# Patient Record
Sex: FEMALE
[De-identification: names, ages, dates, MRNs, and addresses within clinical notes are randomized per-mention and may not be internally consistent; named-entity substitution may affect disease eponyms.]

---

## 2019-07-26 PROBLEM — Z00.00 ENCOUNTER FOR PREVENTIVE HEALTH EXAMINATION: Status: ACTIVE | Noted: 2019-07-26

## 2019-08-01 ENCOUNTER — NON-APPOINTMENT (OUTPATIENT)
Age: 44
End: 2019-08-01

## 2019-08-01 ENCOUNTER — APPOINTMENT (OUTPATIENT)
Dept: HEART AND VASCULAR | Facility: CLINIC | Age: 44
End: 2019-08-01
Payer: COMMERCIAL

## 2019-08-01 VITALS
HEIGHT: 67 IN | HEART RATE: 66 BPM | SYSTOLIC BLOOD PRESSURE: 102 MMHG | WEIGHT: 118 LBS | DIASTOLIC BLOOD PRESSURE: 70 MMHG | BODY MASS INDEX: 18.52 KG/M2

## 2019-08-01 DIAGNOSIS — E55.9 VITAMIN D DEFICIENCY, UNSPECIFIED: ICD-10-CM

## 2019-08-01 DIAGNOSIS — R07.9 CHEST PAIN, UNSPECIFIED: ICD-10-CM

## 2019-08-01 DIAGNOSIS — J45.909 UNSPECIFIED ASTHMA, UNCOMPLICATED: ICD-10-CM

## 2019-08-01 DIAGNOSIS — R79.0 ABNORMAL LVL OF BLOOD MINERAL: ICD-10-CM

## 2019-08-01 DIAGNOSIS — I34.1 NONRHEUMATIC MITRAL (VALVE) PROLAPSE: ICD-10-CM

## 2019-08-01 DIAGNOSIS — Z01.818 ENCOUNTER FOR OTHER PREPROCEDURAL EXAMINATION: ICD-10-CM

## 2019-08-01 DIAGNOSIS — L71.9 ROSACEA, UNSPECIFIED: ICD-10-CM

## 2019-08-01 DIAGNOSIS — Z86.39 PERSONAL HISTORY OF OTHER ENDOCRINE, NUTRITIONAL AND METABOLIC DISEASE: ICD-10-CM

## 2019-08-01 PROCEDURE — 99205 OFFICE O/P NEW HI 60 MIN: CPT | Mod: 25

## 2019-08-01 PROCEDURE — 93000 ELECTROCARDIOGRAM COMPLETE: CPT

## 2019-08-01 RX ORDER — ACETYLCYSTEINE 200 MG/ML
INJECTION INTRAVENOUS
Refills: 0 | Status: ACTIVE | COMMUNITY

## 2019-08-01 RX ORDER — MULTIVITAMIN
TABLET ORAL
Refills: 0 | Status: ACTIVE | COMMUNITY

## 2019-08-01 RX ORDER — NIACIN 100 MG
TABLET ORAL
Refills: 0 | Status: ACTIVE | COMMUNITY

## 2019-08-01 RX ORDER — AMINO ACIDS
CAPSULE ORAL
Refills: 0 | Status: ACTIVE | COMMUNITY

## 2019-08-01 RX ORDER — TAURINE 500 MG
CAPSULE ORAL
Refills: 0 | Status: ACTIVE | COMMUNITY

## 2019-08-01 RX ORDER — PEPPERMINT OIL
OIL (ML) MISCELLANEOUS
Refills: 0 | Status: ACTIVE | COMMUNITY

## 2019-08-01 RX ORDER — CALCIUM CARBONATE/VITAMIN D3 600 MG-10
TABLET ORAL
Refills: 0 | Status: ACTIVE | COMMUNITY

## 2019-08-01 RX ORDER — GARLIC 200 MG
TABLET ORAL
Refills: 0 | Status: ACTIVE | COMMUNITY

## 2019-08-01 RX ORDER — ACETAMINOPHEN 500 MG
TABLET ORAL
Refills: 0 | Status: ACTIVE | COMMUNITY

## 2019-08-01 RX ORDER — LYSINE 600 MG
TABLET ORAL
Refills: 0 | Status: ACTIVE | COMMUNITY

## 2019-08-01 RX ORDER — MELATONIN/PYRIDOXINE HCL (B6) 5 MG-10 MG
TABLET,IMMED, EXTENDED RELEASE, BIPHASIC ORAL
Refills: 0 | Status: ACTIVE | COMMUNITY

## 2019-08-01 RX ORDER — OMEGA-3/DHA/EPA/FISH OIL 300-1000MG
CAPSULE ORAL
Refills: 0 | Status: ACTIVE | COMMUNITY

## 2019-08-01 NOTE — HISTORY OF PRESENT ILLNESS
[FreeTextEntry1] : 45 y/o female with h/o hashimoto's thyroiditis, asthma, low ferritin, MVP who presents for initial evaluation today\par \par Concerned that HR is high, chest pain, palpitations\par CP is nonradiating, lasting seconds, sharp pain\par No sob, syncope, LH, edema, orthopnea, pnd\par Notes increased fatigue\par \par Had seen pulm in past\par \par Stress level medium\par \par \par Echo  told had MVP\par \par vegan diet\par \par Run 100 minutes per week\par \par \par PMH/PSH:\par asthma\par rosacea\par vit D deficiency\par low ferritin\par hashimoto's thyroiditis\par MVP\par \par \par SH:\par no tobacco\par no etoh\par no drugs\par from [place]\par \par no children\par works as [employer]\par \par \par \par ALL:\par nkda\par \par \par MEDS:\par amino acid\par vit C\par glycine\par proline\par glutamine\par vit B1\par levocobalamin\par nacetylcysteine\par chondroitin\par omega 3\par vit D3\par peppermint oil\par coQ10\par curcumin\par taurine\par lysine\par calcium\par magnesium\par potassium\par mvi\par \par FH:\par father - htn, valvular disease, emphysema,  82\par mother - polycythemia vera, TIA/CVA,  64\par sister - alive, SVT, long Qt, 50

## 2019-08-01 NOTE — PHYSICAL EXAM
[General Appearance - Well Developed] : well developed [General Appearance - Well Nourished] : well nourished [General Appearance - In No Acute Distress] : no acute distress [Normal Conjunctiva] : the conjunctiva exhibited no abnormalities [Normal Oropharynx] : normal oropharynx [Normal Jugular Venous V Waves Present] : normal jugular venous V waves present [Heart Rate And Rhythm] : heart rate and rhythm were normal [Heart Sounds] : normal S1 and S2 [Murmurs] : no murmurs present [Arterial Pulses Normal] : the arterial pulses were normal [Edema] : no peripheral edema present [Respiration, Rhythm And Depth] : normal respiratory rhythm and effort [Auscultation Breath Sounds / Voice Sounds] : lungs were clear to auscultation bilaterally [Bowel Sounds] : normal bowel sounds [Abdomen Soft] : soft [Abdomen Tenderness] : non-tender [Abnormal Walk] : normal gait [Cyanosis, Localized] : no localized cyanosis [Nail Clubbing] : no clubbing of the fingernails [] : no rash [Skin Lesions] : no skin lesions [No Anxiety] : not feeling anxious [Oriented To Time, Place, And Person] : oriented to person, place, and time

## 2019-08-01 NOTE — DISCUSSION/SUMMARY
[Patient] : the patient [___ Month(s)] : [unfilled] month(s) [FreeTextEntry1] : 45 y/o female with h/o hashimoto's thyroiditis, asthma, low ferritin, MVP who presents for initial evaluation today\par \par -ordered echo\par -ordered stress echo\par -ordered ekg today - nsr, normal intervals, no st/t changes\par -labs ordered\par -consider calcium score\par -f/up 3 weeks \par